# Patient Record
Sex: MALE | Race: WHITE | NOT HISPANIC OR LATINO | URBAN - METROPOLITAN AREA
[De-identification: names, ages, dates, MRNs, and addresses within clinical notes are randomized per-mention and may not be internally consistent; named-entity substitution may affect disease eponyms.]

---

## 2020-02-14 ENCOUNTER — HOSPITAL ENCOUNTER (OUTPATIENT)
Facility: OTHER | Age: 80
Discharge: HOME | End: 2020-02-15
Attending: EMERGENCY MEDICINE | Admitting: EMERGENCY MEDICINE
Payer: MEDICARE

## 2020-02-14 ENCOUNTER — OFFICE VISIT (OUTPATIENT)
Dept: URGENT CARE | Facility: CLINIC | Age: 80
End: 2020-02-14
Payer: MEDICARE

## 2020-02-14 VITALS
SYSTOLIC BLOOD PRESSURE: 159 MMHG | OXYGEN SATURATION: 91 % | BODY MASS INDEX: 26.51 KG/M2 | RESPIRATION RATE: 18 BRPM | WEIGHT: 200 LBS | HEIGHT: 73 IN | DIASTOLIC BLOOD PRESSURE: 90 MMHG | HEART RATE: 89 BPM | TEMPERATURE: 102 F

## 2020-02-14 DIAGNOSIS — I10 ESSENTIAL HYPERTENSION: ICD-10-CM

## 2020-02-14 DIAGNOSIS — J10.1 INFLUENZA A: Primary | ICD-10-CM

## 2020-02-14 DIAGNOSIS — R09.02 HYPOXIA: ICD-10-CM

## 2020-02-14 DIAGNOSIS — E86.0 DEHYDRATION: ICD-10-CM

## 2020-02-14 DIAGNOSIS — R06.02 SHORTNESS OF BREATH: ICD-10-CM

## 2020-02-14 DIAGNOSIS — J98.4 RESTRICTIVE LUNG DISEASE: ICD-10-CM

## 2020-02-14 DIAGNOSIS — R50.9 FEVER, UNSPECIFIED FEVER CAUSE: ICD-10-CM

## 2020-02-14 DIAGNOSIS — R53.1 WEAKNESS: ICD-10-CM

## 2020-02-14 PROBLEM — E78.5 HYPERLIPIDEMIA: Status: ACTIVE | Noted: 2020-02-14

## 2020-02-14 LAB
ALBUMIN SERPL BCP-MCNC: 3.9 G/DL (ref 3.5–5.2)
ALP SERPL-CCNC: 102 U/L (ref 55–135)
ALT SERPL W/O P-5'-P-CCNC: 24 U/L (ref 10–44)
ANION GAP SERPL CALC-SCNC: 15 MMOL/L (ref 8–16)
AST SERPL-CCNC: 23 U/L (ref 10–40)
BASOPHILS # BLD AUTO: 0.01 K/UL (ref 0–0.2)
BASOPHILS NFR BLD: 0.2 % (ref 0–1.9)
BILIRUB SERPL-MCNC: 1.1 MG/DL (ref 0.1–1)
BNP SERPL-MCNC: 145 PG/ML (ref 0–99)
BUN SERPL-MCNC: 13 MG/DL (ref 8–23)
CALCIUM SERPL-MCNC: 9.5 MG/DL (ref 8.7–10.5)
CHLORIDE SERPL-SCNC: 96 MMOL/L (ref 95–110)
CO2 SERPL-SCNC: 29 MMOL/L (ref 23–29)
CREAT SERPL-MCNC: 0.9 MG/DL (ref 0.5–1.4)
CTP QC/QA: YES
DIFFERENTIAL METHOD: ABNORMAL
EOSINOPHIL # BLD AUTO: 0 K/UL (ref 0–0.5)
EOSINOPHIL NFR BLD: 0.4 % (ref 0–8)
ERYTHROCYTE [DISTWIDTH] IN BLOOD BY AUTOMATED COUNT: 15.8 % (ref 11.5–14.5)
EST. GFR  (AFRICAN AMERICAN): >60 ML/MIN/1.73 M^2
EST. GFR  (NON AFRICAN AMERICAN): >60 ML/MIN/1.73 M^2
EXTRA BLUE TOP RAINBOW: NORMAL
EXTRA GOLD TOP RAINBOW: NORMAL
EXTRA GREEN TOP RAINBOW: NORMAL
EXTRA LAVENDER TOP RAINBOW: NORMAL
EXTRA RED TOP RAINBOW: NORMAL
FLUAV AG NPH QL: POSITIVE
FLUBV AG NPH QL: NEGATIVE
GLUCOSE SERPL-MCNC: 109 MG/DL (ref 70–110)
HCT VFR BLD AUTO: 47.8 % (ref 40–54)
HGB BLD-MCNC: 14.9 G/DL (ref 14–18)
IMM GRANULOCYTES # BLD AUTO: 0.09 K/UL (ref 0–0.04)
IMM GRANULOCYTES NFR BLD AUTO: 1.6 % (ref 0–0.5)
LYMPHOCYTES # BLD AUTO: 0.6 K/UL (ref 1–4.8)
LYMPHOCYTES NFR BLD: 10.2 % (ref 18–48)
MCH RBC QN AUTO: 31.9 PG (ref 27–31)
MCHC RBC AUTO-ENTMCNC: 31.2 G/DL (ref 32–36)
MCV RBC AUTO: 102 FL (ref 82–98)
MONOCYTES # BLD AUTO: 0.7 K/UL (ref 0.3–1)
MONOCYTES NFR BLD: 13.2 % (ref 4–15)
NEUTROPHILS # BLD AUTO: 4.2 K/UL (ref 1.8–7.7)
NEUTROPHILS NFR BLD: 74.4 % (ref 38–73)
NRBC BLD-RTO: 0 /100 WBC
PLATELET # BLD AUTO: 97 K/UL (ref 150–350)
PMV BLD AUTO: 11.9 FL (ref 9.2–12.9)
POTASSIUM SERPL-SCNC: 3.5 MMOL/L (ref 3.5–5.1)
PROT SERPL-MCNC: 6.9 G/DL (ref 6–8.4)
RBC # BLD AUTO: 4.67 M/UL (ref 4.6–6.2)
SODIUM SERPL-SCNC: 140 MMOL/L (ref 136–145)
TROPONIN I SERPL DL<=0.01 NG/ML-MCNC: 0.03 NG/ML (ref 0–0.03)
WBC # BLD AUTO: 5.6 K/UL (ref 3.9–12.7)

## 2020-02-14 PROCEDURE — 87804 POCT INFLUENZA A/B: ICD-10-PCS | Mod: 59,QW,S$GLB, | Performed by: STUDENT IN AN ORGANIZED HEALTH CARE EDUCATION/TRAINING PROGRAM

## 2020-02-14 PROCEDURE — 63600175 PHARM REV CODE 636 W HCPCS: Performed by: EMERGENCY MEDICINE

## 2020-02-14 PROCEDURE — 99203 PR OFFICE/OUTPT VISIT, NEW, LEVL III, 30-44 MIN: ICD-10-PCS | Mod: 25,S$GLB,, | Performed by: STUDENT IN AN ORGANIZED HEALTH CARE EDUCATION/TRAINING PROGRAM

## 2020-02-14 PROCEDURE — 96360 HYDRATION IV INFUSION INIT: CPT

## 2020-02-14 PROCEDURE — G0378 HOSPITAL OBSERVATION PER HR: HCPCS

## 2020-02-14 PROCEDURE — 36415 COLL VENOUS BLD VENIPUNCTURE: CPT

## 2020-02-14 PROCEDURE — 71046 X-RAY EXAM CHEST 2 VIEWS: CPT | Mod: FY,S$GLB,, | Performed by: RADIOLOGY

## 2020-02-14 PROCEDURE — 96361 HYDRATE IV INFUSION ADD-ON: CPT

## 2020-02-14 PROCEDURE — 25000003 PHARM REV CODE 250: Performed by: NURSE PRACTITIONER

## 2020-02-14 PROCEDURE — 27000221 HC OXYGEN, UP TO 24 HOURS

## 2020-02-14 PROCEDURE — 25000242 PHARM REV CODE 250 ALT 637 W/ HCPCS: Performed by: EMERGENCY MEDICINE

## 2020-02-14 PROCEDURE — 99900035 HC TECH TIME PER 15 MIN (STAT)

## 2020-02-14 PROCEDURE — 83880 ASSAY OF NATRIURETIC PEPTIDE: CPT

## 2020-02-14 PROCEDURE — 80053 COMPREHEN METABOLIC PANEL: CPT

## 2020-02-14 PROCEDURE — 99220 PR INITIAL OBSERVATION CARE,LEVL III: ICD-10-PCS | Mod: ,,, | Performed by: NURSE PRACTITIONER

## 2020-02-14 PROCEDURE — 71046 XR CHEST PA AND LATERAL: ICD-10-PCS | Mod: FY,S$GLB,, | Performed by: RADIOLOGY

## 2020-02-14 PROCEDURE — 99203 OFFICE O/P NEW LOW 30 MIN: CPT | Mod: 25,S$GLB,, | Performed by: STUDENT IN AN ORGANIZED HEALTH CARE EDUCATION/TRAINING PROGRAM

## 2020-02-14 PROCEDURE — 84484 ASSAY OF TROPONIN QUANT: CPT

## 2020-02-14 PROCEDURE — 99220 PR INITIAL OBSERVATION CARE,LEVL III: CPT | Mod: ,,, | Performed by: NURSE PRACTITIONER

## 2020-02-14 PROCEDURE — 27000190 HC CPAP FULL FACE MASK W/VALVE

## 2020-02-14 PROCEDURE — 94761 N-INVAS EAR/PLS OXIMETRY MLT: CPT

## 2020-02-14 PROCEDURE — 85025 COMPLETE CBC W/AUTO DIFF WBC: CPT

## 2020-02-14 PROCEDURE — 94660 CPAP INITIATION&MGMT: CPT

## 2020-02-14 PROCEDURE — 25000003 PHARM REV CODE 250: Performed by: EMERGENCY MEDICINE

## 2020-02-14 PROCEDURE — 94640 AIRWAY INHALATION TREATMENT: CPT

## 2020-02-14 PROCEDURE — 99285 EMERGENCY DEPT VISIT HI MDM: CPT | Mod: 25

## 2020-02-14 PROCEDURE — 83605 ASSAY OF LACTIC ACID: CPT

## 2020-02-14 PROCEDURE — 87804 INFLUENZA ASSAY W/OPTIC: CPT | Mod: QW,S$GLB,, | Performed by: STUDENT IN AN ORGANIZED HEALTH CARE EDUCATION/TRAINING PROGRAM

## 2020-02-14 RX ORDER — SODIUM CHLORIDE 0.9 % (FLUSH) 0.9 %
10 SYRINGE (ML) INJECTION
Status: DISCONTINUED | OUTPATIENT
Start: 2020-02-14 | End: 2020-02-15 | Stop reason: HOSPADM

## 2020-02-14 RX ORDER — OSELTAMIVIR PHOSPHATE 75 MG/1
75 CAPSULE ORAL 2 TIMES DAILY
Status: DISCONTINUED | OUTPATIENT
Start: 2020-02-14 | End: 2020-02-15 | Stop reason: HOSPADM

## 2020-02-14 RX ORDER — METOPROLOL SUCCINATE 25 MG/1
25 TABLET, EXTENDED RELEASE ORAL DAILY
Status: DISCONTINUED | OUTPATIENT
Start: 2020-02-15 | End: 2020-02-15 | Stop reason: HOSPADM

## 2020-02-14 RX ORDER — ALLOPURINOL 300 MG/1
TABLET ORAL
COMMUNITY
Start: 2020-01-11

## 2020-02-14 RX ORDER — IPRATROPIUM BROMIDE AND ALBUTEROL SULFATE 2.5; .5 MG/3ML; MG/3ML
3 SOLUTION RESPIRATORY (INHALATION) EVERY 4 HOURS PRN
Status: DISCONTINUED | OUTPATIENT
Start: 2020-02-14 | End: 2020-02-15 | Stop reason: HOSPADM

## 2020-02-14 RX ORDER — ACETAMINOPHEN 325 MG/1
650 TABLET ORAL EVERY 4 HOURS PRN
Status: DISCONTINUED | OUTPATIENT
Start: 2020-02-14 | End: 2020-02-15 | Stop reason: HOSPADM

## 2020-02-14 RX ORDER — LOSARTAN POTASSIUM 25 MG/1
25 TABLET ORAL NIGHTLY
Status: DISCONTINUED | OUTPATIENT
Start: 2020-02-14 | End: 2020-02-15 | Stop reason: HOSPADM

## 2020-02-14 RX ORDER — CLOPIDOGREL BISULFATE 75 MG/1
TABLET ORAL
COMMUNITY
Start: 2019-11-27

## 2020-02-14 RX ORDER — ACETAMINOPHEN 500 MG
1000 TABLET ORAL
Status: DISCONTINUED | OUTPATIENT
Start: 2020-02-14 | End: 2020-02-14 | Stop reason: HOSPADM

## 2020-02-14 RX ORDER — ONDANSETRON 8 MG/1
8 TABLET, ORALLY DISINTEGRATING ORAL EVERY 8 HOURS PRN
Status: DISCONTINUED | OUTPATIENT
Start: 2020-02-14 | End: 2020-02-15 | Stop reason: HOSPADM

## 2020-02-14 RX ORDER — PRAVASTATIN SODIUM 20 MG/1
80 TABLET ORAL NIGHTLY
Status: DISCONTINUED | OUTPATIENT
Start: 2020-02-14 | End: 2020-02-15 | Stop reason: HOSPADM

## 2020-02-14 RX ORDER — METHYLPREDNISOLONE 4 MG/1
4 TABLET ORAL DAILY
Status: DISCONTINUED | OUTPATIENT
Start: 2020-02-15 | End: 2020-02-15 | Stop reason: HOSPADM

## 2020-02-14 RX ORDER — PRAVASTATIN SODIUM 80 MG/1
TABLET ORAL
COMMUNITY
Start: 2019-12-03

## 2020-02-14 RX ORDER — IRBESARTAN 150 MG/1
TABLET ORAL
COMMUNITY
Start: 2019-11-15

## 2020-02-14 RX ORDER — CLOPIDOGREL BISULFATE 75 MG/1
75 TABLET ORAL DAILY
Status: DISCONTINUED | OUTPATIENT
Start: 2020-02-15 | End: 2020-02-15 | Stop reason: HOSPADM

## 2020-02-14 RX ORDER — METHYLPREDNISOLONE 4 MG/1
4 TABLET ORAL DAILY
COMMUNITY

## 2020-02-14 RX ORDER — ALLOPURINOL 300 MG/1
300 TABLET ORAL DAILY
Status: DISCONTINUED | OUTPATIENT
Start: 2020-02-15 | End: 2020-02-15 | Stop reason: HOSPADM

## 2020-02-14 RX ORDER — IPRATROPIUM BROMIDE AND ALBUTEROL SULFATE 2.5; .5 MG/3ML; MG/3ML
3 SOLUTION RESPIRATORY (INHALATION)
Status: COMPLETED | OUTPATIENT
Start: 2020-02-14 | End: 2020-02-14

## 2020-02-14 RX ORDER — OSELTAMIVIR PHOSPHATE 75 MG/1
75 CAPSULE ORAL
Status: COMPLETED | OUTPATIENT
Start: 2020-02-14 | End: 2020-02-14

## 2020-02-14 RX ORDER — METOPROLOL SUCCINATE 25 MG/1
25 TABLET, EXTENDED RELEASE ORAL DAILY
COMMUNITY

## 2020-02-14 RX ORDER — LOSARTAN POTASSIUM 25 MG/1
TABLET ORAL
COMMUNITY
Start: 2019-12-03

## 2020-02-14 RX ORDER — IBUPROFEN 400 MG/1
800 TABLET ORAL
Status: DISCONTINUED | OUTPATIENT
Start: 2020-02-14 | End: 2020-02-14

## 2020-02-14 RX ADMIN — IPRATROPIUM BROMIDE AND ALBUTEROL SULFATE 3 ML: .5; 3 SOLUTION RESPIRATORY (INHALATION) at 04:02

## 2020-02-14 RX ADMIN — SODIUM CHLORIDE 1000 ML: 0.9 INJECTION, SOLUTION INTRAVENOUS at 03:02

## 2020-02-14 RX ADMIN — APIXABAN 5 MG: 2.5 TABLET, FILM COATED ORAL at 09:02

## 2020-02-14 RX ADMIN — OSELTAMIVIR PHOSPHATE 75 MG: 75 CAPSULE ORAL at 03:02

## 2020-02-14 RX ADMIN — PRAVASTATIN SODIUM 80 MG: 20 TABLET ORAL at 09:02

## 2020-02-14 RX ADMIN — LOSARTAN POTASSIUM 25 MG: 25 TABLET ORAL at 09:02

## 2020-02-14 RX ADMIN — IPRATROPIUM BROMIDE AND ALBUTEROL SULFATE 3 ML: .5; 3 SOLUTION RESPIRATORY (INHALATION) at 03:02

## 2020-02-14 RX ADMIN — OSELTAMIVIR PHOSPHATE 75 MG: 75 CAPSULE ORAL at 09:02

## 2020-02-14 RX ADMIN — Medication 1000 MG: at 12:02

## 2020-02-14 NOTE — ED PROVIDER NOTES
Encounter Date: 2/14/2020    SCRIBE #1 NOTE: I, Velvet Ribera, am scribing for, and in the presence of, Dr. Willoughby.       History     Chief Complaint   Patient presents with    Influenza     sent from urgent care after being dx'd with flu.  Pt reports increased SOB.  Reports hx of MI, a.fib, and restrictive lung disease.     Time seen by provider: 3:00 PM    This is a 79 y.o. male with hx of HTN, Afib on Eliquis, MI, and unknown restrictive lung disease who presents with complaint of fatigue since yesterday morning. He reports fever, loss of appetite, rhinorrhea, cough, chronic shortness of breath, nausea, difficulty ambulating, myalgias, and headache. He denies vomiting, diarrhea, or constipation. He was seen at Urgent Care today, had chest X-Ray, tested positive for Influenza A, and was instructed to go to the ED for further evaluation. He uses CPAP at night. He denies recent international travel. No sick contacts. He is visiting from [place] and plans to return home tomorrow.    The history is provided by the patient, medical records and the spouse.     Review of patient's allergies indicates:   Allergen Reactions    Bee sting [allergen ext-venom-honey bee] Anaphylaxis    Penicillins     Ticagrelor      Past Medical History:   Diagnosis Date    Autoimmune disorder     Gout     Hyperlipidemia     Hypertension      Past Surgical History:   Procedure Laterality Date    CARDIAC SURGERY       History reviewed. No pertinent family history.  Social History     Tobacco Use    Smoking status: Never Smoker   Substance Use Topics    Alcohol use: Yes     Comment: social    Drug use: Not on file     Review of Systems   Constitutional: Positive for appetite change, fatigue and fever. Negative for chills.   HENT: Positive for rhinorrhea. Negative for congestion and sore throat.    Eyes: Negative for photophobia and redness.   Respiratory: Positive for cough and shortness of breath.    Cardiovascular: Negative for  chest pain.   Gastrointestinal: Positive for nausea. Negative for abdominal pain, constipation, diarrhea and vomiting.   Genitourinary: Negative for dysuria.   Musculoskeletal: Positive for gait problem and myalgias. Negative for back pain.   Skin: Negative for rash.   Neurological: Positive for headaches. Negative for weakness and light-headedness.   Psychiatric/Behavioral: Negative for confusion.       Physical Exam     Initial Vitals [02/14/20 1323]   BP Pulse Resp Temp SpO2   139/75 86 (!) 22 100.2 °F (37.9 °C) (!) 92 %      MAP       --         Physical Exam    Nursing note and vitals reviewed.  Constitutional: He appears well-developed and well-nourished. He is not diaphoretic. No distress.   HENT:   Head: Normocephalic and atraumatic.   Eyes: Conjunctivae and EOM are normal. Pupils are equal, round, and reactive to light. No scleral icterus.   Neck: Normal range of motion. Neck supple.   Cardiovascular: Normal rate, regular rhythm and normal heart sounds. Exam reveals no gallop and no friction rub.    No murmur heard.  Pulmonary/Chest: No respiratory distress. He has wheezes (mild). He has no rhonchi. He has no rales.   Abdominal: Soft. There is no tenderness.   Musculoskeletal: Normal range of motion. He exhibits no edema or tenderness.   Neurological: He is alert and oriented to person, place, and time.   Skin: Skin is warm and dry.         ED Course   Procedures  Labs Reviewed   CBC W/ AUTO DIFFERENTIAL - Abnormal; Notable for the following components:       Result Value    Mean Corpuscular Volume 102 (*)     Mean Corpuscular Hemoglobin 31.9 (*)     Mean Corpuscular Hemoglobin Conc 31.2 (*)     RDW 15.8 (*)     Platelets 97 (*)     Immature Granulocytes 1.6 (*)     Immature Grans (Abs) 0.09 (*)     Lymph # 0.6 (*)     Gran% 74.4 (*)     Lymph% 10.2 (*)     All other components within normal limits   COMPREHENSIVE METABOLIC PANEL - Abnormal; Notable for the following components:    Total Bilirubin 1.1 (*)      All other components within normal limits   B-TYPE NATRIURETIC PEPTIDE - Abnormal; Notable for the following components:     (*)     All other components within normal limits   TROPONIN I - Abnormal; Notable for the following components:    Troponin I 0.033 (*)     All other components within normal limits   BLUE-TOP TUBE   GREEN-TOP TUBE   LAVENDER-TOP TUBE   GOLD-TOP TUBE   RED-TOP TUBE   CBC W/ AUTO DIFFERENTIAL   COMPREHENSIVE METABOLIC PANEL   B-TYPE NATRIURETIC PEPTIDE   TROPONIN I          Imaging Results    None          Medical Decision Making:   History:   Old Medical Records: I decided to obtain old medical records.  Clinical Tests:   Lab Tests: Ordered and Reviewed            Scribe Attestation:   Scribe #1: I performed the above scribed service and the documentation accurately describes the services I performed. I attest to the accuracy of the note.    Attending Attestation:           Physician Attestation for Scribe:  Physician Attestation Statement for Scribe #1: I, Dr. Willoughby, reviewed documentation, as scribed by Velvet Ribera in my presence, and it is both accurate and complete.         Attending ED Notes:   Emergent evaluation a 79-year-old male with complaint of generalized weakness, fatigue, near syncope and shortness of breath.  Patient had recent diagnosis of influenza at urgent care.  Patient unable to stand secondary to weakness. Patient is afebrile, nontoxic-appearing stable vital signs.  Oxygen saturation is 92% on room air.  Oxygen saturation increases to 98% with 2 L of oxygen by nasal cannula.  Patient has no elevation white blood cell count.  H&H within normal limits. No acute findings on CMP except for total bili 1.1 BNP is 145.  Troponin is 0.033.  The patient is extensively counseled on his diagnosis and treatment including laboratory and physical exam findings.  The patient is admitted in stable condition.          ED Course as of Feb 15 2039   Fri Feb 14, 2020   1813  Discussed case with Noah Wu NP, and will admit patient to Dr. Ramirez.    [AC]      ED Course User Index  [AC] Velvet Ribera                Clinical Impression:     1. Influenza A    2. Dehydration    3. Weakness    4. Shortness of breath    5. Restrictive lung disease    6. Essential hypertension                              Jerson Degroot MD  02/15/20 2039

## 2020-02-14 NOTE — ED NOTES
"Pt refused ibuprofen, stating " I cant take Ibuprofen because im in blood thinners."   MD aware.  "

## 2020-02-14 NOTE — PATIENT INSTRUCTIONS
The Flu (Influenza)     The virus that causes the flu spreads through the air in droplets when someone who has the flu coughs, sneezes, laughs, or talks.   The flu (influenza) is an infection that affects your respiratory tract. This tract is made up of your mouth, nose, and lungs, and the passages between them. Unlike a cold, the flu can make you very ill. And it can lead to pneumonia, a serious lung infection. The flu can have serious complications and even cause death.  Who is at risk for the flu?  Anyone can get the flu. But you are more likely to become infected if you:  · Have a weakened immune system  · Work in a healthcare setting where you may be exposed to flu germs  · Live or work with someone who has the flu  · Havent had an annual flu shot  How does the flu spread?  The flu is caused by a virus. The virus spreads through the air in droplets when someone who has the flu coughs, sneezes, laughs, or talks. You can become infected when you inhale these viruses directly. You can also become infected when you touch a surface on which the droplets have landed and then transfer the germs to your eyes, nose, or mouth. Touching used tissues, or sharing utensils, drinking glasses, or a toothbrush from an infected person can expose you to flu viruses, too.  What are the symptoms of the flu?  Flu symptoms tend to come on quickly and may last a few days to a few weeks. They include:  · Fever usually higher than 100.4°F  (38°C) and chills  · Sore throat and headache  · Dry cough  · Runny nose  · Tiredness and weakness  · Muscle aches  Who is at risk for flu complications?  For some people, the flu can be very serious. The risk for complications is greater for:  · Children younger than age 5  · Adults ages 65 and older  · People with a chronic illness such as diabetes or heart, kidney, or lung disease  · People who live in a nursing home or long-term care facility   How is the flu treated?  The flu usually gets  better after 7 days or so. In some cases, your healthcare provider may prescribe an antiviral medicine. This may help you get well a little sooner. For the medicine to help, you need to take it as soon as possible (ideally within 48 hours) after your symptoms start. If you develop pneumonia or other serious illness, you may need to stay in the hospital.  Easing flu symptoms  · Drink lots of fluids such as water, juice, and warm soup. A good rule is to drink enough so that you urinate your normal amount.  · Get plenty of rest.  · Ask your healthcare provider what to take for fever and pain.  · Call your provider if your fever is 100.4°F (38°C) or higher, or you become dizzy, lightheaded, or short of breath.  Taking steps to protect others  · Wash your hands often, especially after coughing or sneezing. Or clean your hands with an alcohol-based hand  containing at least 60% alcohol.  · Cough or sneeze into a tissue. Then throw the tissue away and wash your hands. If you dont have a tissue, cough and sneeze into your elbow.  · Stay home until at least 24 hours after you no longer have a fever or chills. Be sure the fever isnt being hidden by fever-reducing medicine.  · Dont share food, utensils, drinking glasses, or a toothbrush with others.  · Ask your healthcare provider if others in your household should get antiviral medicine to help them avoid infection.  How can the flu be prevented?  · One of the best ways to avoid the flu is to get a flu vaccine each year. The virus that causes the flu changes from year to year. For that reason, healthcare providers recommend getting the flu vaccine each year, as soon as it's available in your area. The vaccine is given as a shot. Your healthcare provider can tell you which vaccine is right for you. A nasal spray is also available but is not recommended for the 3095-0506 flu season. The CDC says this is because the nasal spray did not seem to protect against the flu  over the last several flu seasons. In the past, it was meant for people ages 2 to 49.  · Wash your hands often. Frequent handwashing is a proven way to help prevent infection.  · Carry an alcohol-based hand gel containing at least 60% alcohol. Use it when you can't use soap and water. Then wash your hands as soon as you can.  · Avoid touching your eyes, nose, and mouth.  · At home and work, clean phones, computer keyboards, and toys often with disinfectant wipes.  · If possible, avoid close contact with others who have the flu or symptoms of the flu.  Handwashing tips  Handwashing is one of the best ways to prevent many common infections. If you are caring for or visiting someone with the flu, wash your hands each time you enter and leave the room. Follow these steps:  · Use warm water and plenty of soap. Rub your hands together well.  · Clean the whole hand, including under your nails, between your fingers, and up the wrists.  · Wash for at least 15 seconds.  · Rinse, letting the water run down your fingers, not up your wrists.  · Dry your hands well. Use a paper towel to turn off the faucet and open the door.  Using alcohol-based hand   Alcohol-based hand  are also a good choice. Use them when you can't use soap and water. Follow these steps:  · Squeeze about a tablespoon of gel into the palm of one hand.  · Rub your hands together briskly, cleaning the backs of your hands, the palms, between your fingers, and up the wrists.  · Rub until the gel is gone and your hands are completely dry.  Preventing the flu in healthcare settings  The flu is a special concern for people in hospitals and long-term care facilities. To help prevent the spread of flu, many hospitals and nursing homes take these steps:  · Healthcare providers wash their hands or use an alcohol-based hand  before and after treating each patient.  · People with the flu have private rooms and bathrooms or share a room with someone  with the same infection.  · People who are at high risk for the flu but don't have it are encouraged to get the flu and pneumonia vaccines.  · All healthcare workers are encouraged or required to get flu shots.   Date Last Reviewed: 12/1/2016  © 8457-6532 [employer]. [address] [place] [address]. All rights reserved. This information is not intended as a substitute for professional medical care. Always follow your healthcare professional's instructions.

## 2020-02-14 NOTE — ED TRIAGE NOTES
Pt presents to ed c/o flu like s/s. Pt states having fever, chills, unsteady gait, generalized body aches, with nausea but denies any vomiting. Pt states taking 1g of tylenol at urgent care. Pt awake and alert, denies any needs at this point.

## 2020-02-14 NOTE — PROGRESS NOTES
"Subjective:       Patient ID: Teo Escalona is a 79 y.o. male.    Vitals:  height is 6' 1" (1.854 m) and weight is 90.7 kg (200 lb). His temperature is 102.2 °F (39 °C) (abnormal). His blood pressure is 159/90 (abnormal) and his pulse is 89. His respiration is 18.     Chief Complaint: Fatigue    Pt is 78yo M with PMHx of HTN, CAD, afib (on eliquis), PMR, and unknown chronic lung disease who presents for flu-like symptoms. Pt is visiting from [place]. Pt complains of f/c, dizziness, SoB, body aches, and fatigue x2d. Pt last took tylenol 500mg at 8am.     Fatigue   This is a new problem. The current episode started in the past 7 days. The problem occurs constantly. The problem has been unchanged. Associated symptoms include chills, congestion, coughing, fatigue, a fever, headaches, myalgias, nausea and a sore throat. Pertinent negatives include no abdominal pain, anorexia, arthralgias, chest pain, diaphoresis, joint swelling, neck pain, numbness, rash, vomiting or weakness. Nothing aggravates the symptoms. He has tried acetaminophen (benadryl) for the symptoms. The treatment provided mild relief.       Constitution: Positive for chills, fatigue and fever. Negative for sweating.   HENT: Positive for congestion and sore throat. Negative for ear pain, sinus pain, sinus pressure, trouble swallowing and voice change.    Neck: Negative for neck pain, neck stiffness and painful lymph nodes.   Cardiovascular: Positive for sob on exertion. Negative for chest pain, leg swelling and palpitations.   Eyes: Negative for eye discharge, eye itching, eye redness and blurred vision.   Respiratory: Positive for cough and shortness of breath. Negative for chest tightness, sputum production, COPD, stridor, wheezing and asthma.    Gastrointestinal: Positive for nausea. Negative for abdominal pain, vomiting and diarrhea.   Genitourinary: Negative for urine decreased.   Musculoskeletal: Positive for muscle ache. Negative for joint pain " "and joint swelling.   Skin: Negative for color change, rash and lesion.   Allergic/Immunologic: Negative for seasonal allergies, asthma and sneezing.   Neurological: Positive for dizziness, light-headedness and headaches. Negative for passing out and numbness.   Hematologic/Lymphatic: Negative for swollen lymph nodes.   Psychiatric/Behavioral: Negative for confusion, agitation and sleep disturbance.       Objective:       Vitals:    02/14/20 1119 02/14/20 1252   BP: (!) 159/90    Pulse: 89    Resp: 18    Temp: (!) 102.2 °F (39 °C) (!) 102.3 °F (39.1 °C)   TempSrc:  Tympanic   SpO2: (!) 91%    Weight: 90.7 kg (200 lb)    Height: 6' 1" (1.854 m)      Physical Exam   Constitutional: He is oriented to person, place, and time. He appears well-developed and well-nourished. No distress.   HENT:   Head: Normocephalic and atraumatic.   Right Ear: Hearing, tympanic membrane, external ear and ear canal normal.   Left Ear: Hearing, tympanic membrane, external ear and ear canal normal.   Nose: Rhinorrhea present. No mucosal edema or purulent discharge.   Mouth/Throat: Uvula is midline. Mucous membranes are dry. Posterior oropharyngeal erythema present. No posterior oropharyngeal edema or tonsillar abscesses.   Eyes: Conjunctivae and EOM are normal. Right eye exhibits no discharge. Left eye exhibits no discharge.   Neck: Normal range of motion. Neck supple.   Cardiovascular: Normal rate, regular rhythm and normal heart sounds.   No murmur heard.  Pulmonary/Chest: Effort normal. He has decreased breath sounds. He has no wheezes. He has rales (b/l LL crackles).   Abdominal: Soft. Bowel sounds are normal. He exhibits no distension. There is no tenderness.   Musculoskeletal: Normal range of motion. He exhibits no edema or tenderness.   Neurological: He is alert and oriented to person, place, and time. No cranial nerve deficit (CN II-XII grossly intact).   Skin: Skin is warm, dry and no rash.   Psychiatric: He has a normal mood and " affect. His behavior is normal. Judgment and thought content normal.   Nursing note and vitals reviewed.    Recent Lab Results       02/14/20  1145         Acceptable Yes     Rapid Influenza A Ag Positive     Rapid Influenza B Ag Negative           XR CHEST PA AND LATERAL  Narrative: EXAMINATION:  XR CHEST PA AND LATERAL    CLINICAL HISTORY:  +Influenza with b/l crackles and hypoxia; reports chronic hypoxia with chronic atelectasis/lung scarring, no priors; Influenza due to other identified influenza virus with other respiratory manifestations    TECHNIQUE:  PA and lateral views of the chest were performed.    COMPARISON:  None    FINDINGS:  Heart size normal.  Small subsegmental atelectatic changes noted at the left lung base.  Otherwise the lungs are clear.  No pleural effusion.  Impression: See above    Electronically signed by: Teo Hollins MD  Date:    02/14/2020  Time:    12:22        Assessment:       1. Influenza A    2. Fever, unspecified fever cause    3. Hypoxia        Plan:         Influenza A  -     POCT Influenza A/B  -     XR CHEST PA AND LATERAL; Future; Expected date: 02/14/2020  -     Refer to Emergency Dept.    Fever, unspecified fever cause  -     acetaminophen tablet 1,000 mg  -     Refer to Emergency Dept.    Hypoxia  -     Refer to Emergency Dept.    Pt is 80yo M with PMHx of HTN, CAD, afib (on eliquis), PMR, and unknown chronic lung disease who presents with influenza A. CXR with no acute process but evidence of chronic lung disease. Fever without response 40 minutes after 1g Tylenol. Given comorbidities and symptoms of SoB and dizziness, recommended evaluation at [facility] ED. Questions answered and pt and wife expressed understanding.    Rangel Agee MD/MPH  [facility]  [facility]

## 2020-02-15 VITALS
WEIGHT: 202.94 LBS | SYSTOLIC BLOOD PRESSURE: 125 MMHG | BODY MASS INDEX: 26.9 KG/M2 | TEMPERATURE: 99 F | DIASTOLIC BLOOD PRESSURE: 68 MMHG | RESPIRATION RATE: 18 BRPM | HEIGHT: 73 IN | HEART RATE: 70 BPM | OXYGEN SATURATION: 91 %

## 2020-02-15 LAB
ALBUMIN SERPL BCP-MCNC: 3.1 G/DL (ref 3.5–5.2)
ALP SERPL-CCNC: 81 U/L (ref 55–135)
ALT SERPL W/O P-5'-P-CCNC: 19 U/L (ref 10–44)
ANION GAP SERPL CALC-SCNC: 10 MMOL/L (ref 8–16)
AST SERPL-CCNC: 20 U/L (ref 10–40)
BASOPHILS # BLD AUTO: 0.01 K/UL (ref 0–0.2)
BASOPHILS NFR BLD: 0.3 % (ref 0–1.9)
BILIRUB SERPL-MCNC: 1 MG/DL (ref 0.1–1)
BUN SERPL-MCNC: 11 MG/DL (ref 8–23)
CALCIUM SERPL-MCNC: 8.4 MG/DL (ref 8.7–10.5)
CHLORIDE SERPL-SCNC: 101 MMOL/L (ref 95–110)
CO2 SERPL-SCNC: 26 MMOL/L (ref 23–29)
CREAT SERPL-MCNC: 0.7 MG/DL (ref 0.5–1.4)
DIFFERENTIAL METHOD: ABNORMAL
EOSINOPHIL # BLD AUTO: 0 K/UL (ref 0–0.5)
EOSINOPHIL NFR BLD: 0.6 % (ref 0–8)
ERYTHROCYTE [DISTWIDTH] IN BLOOD BY AUTOMATED COUNT: 15.7 % (ref 11.5–14.5)
EST. GFR  (AFRICAN AMERICAN): >60 ML/MIN/1.73 M^2
EST. GFR  (NON AFRICAN AMERICAN): >60 ML/MIN/1.73 M^2
GLUCOSE SERPL-MCNC: 82 MG/DL (ref 70–110)
HCT VFR BLD AUTO: 41.5 % (ref 40–54)
HGB BLD-MCNC: 13.2 G/DL (ref 14–18)
IMM GRANULOCYTES # BLD AUTO: 0.06 K/UL (ref 0–0.04)
IMM GRANULOCYTES NFR BLD AUTO: 1.7 % (ref 0–0.5)
LACTATE SERPL-SCNC: 1 MMOL/L (ref 0.5–2.2)
LYMPHOCYTES # BLD AUTO: 0.9 K/UL (ref 1–4.8)
LYMPHOCYTES NFR BLD: 25.9 % (ref 18–48)
MAGNESIUM SERPL-MCNC: 1.5 MG/DL (ref 1.6–2.6)
MCH RBC QN AUTO: 32.3 PG (ref 27–31)
MCHC RBC AUTO-ENTMCNC: 31.8 G/DL (ref 32–36)
MCV RBC AUTO: 102 FL (ref 82–98)
MONOCYTES # BLD AUTO: 0.7 K/UL (ref 0.3–1)
MONOCYTES NFR BLD: 18.9 % (ref 4–15)
NEUTROPHILS # BLD AUTO: 1.9 K/UL (ref 1.8–7.7)
NEUTROPHILS NFR BLD: 52.6 % (ref 38–73)
NRBC BLD-RTO: 0 /100 WBC
PHOSPHATE SERPL-MCNC: 3.6 MG/DL (ref 2.7–4.5)
PLATELET # BLD AUTO: 89 K/UL (ref 150–350)
PMV BLD AUTO: 11.3 FL (ref 9.2–12.9)
POTASSIUM SERPL-SCNC: 3.3 MMOL/L (ref 3.5–5.1)
PROT SERPL-MCNC: 5.6 G/DL (ref 6–8.4)
RBC # BLD AUTO: 4.09 M/UL (ref 4.6–6.2)
SODIUM SERPL-SCNC: 137 MMOL/L (ref 136–145)
WBC # BLD AUTO: 3.59 K/UL (ref 3.9–12.7)

## 2020-02-15 PROCEDURE — 99900035 HC TECH TIME PER 15 MIN (STAT)

## 2020-02-15 PROCEDURE — 83735 ASSAY OF MAGNESIUM: CPT

## 2020-02-15 PROCEDURE — G0378 HOSPITAL OBSERVATION PER HR: HCPCS

## 2020-02-15 PROCEDURE — 94761 N-INVAS EAR/PLS OXIMETRY MLT: CPT

## 2020-02-15 PROCEDURE — 85025 COMPLETE CBC W/AUTO DIFF WBC: CPT

## 2020-02-15 PROCEDURE — 36415 COLL VENOUS BLD VENIPUNCTURE: CPT

## 2020-02-15 PROCEDURE — 99217 PR OBSERVATION CARE DISCHARGE: CPT | Mod: ,,, | Performed by: HOSPITALIST

## 2020-02-15 PROCEDURE — 84100 ASSAY OF PHOSPHORUS: CPT

## 2020-02-15 PROCEDURE — 94660 CPAP INITIATION&MGMT: CPT

## 2020-02-15 PROCEDURE — 63600175 PHARM REV CODE 636 W HCPCS: Performed by: NURSE PRACTITIONER

## 2020-02-15 PROCEDURE — 25000003 PHARM REV CODE 250: Performed by: NURSE PRACTITIONER

## 2020-02-15 PROCEDURE — 27000221 HC OXYGEN, UP TO 24 HOURS

## 2020-02-15 PROCEDURE — 80053 COMPREHEN METABOLIC PANEL: CPT

## 2020-02-15 PROCEDURE — 99217 PR OBSERVATION CARE DISCHARGE: ICD-10-PCS | Mod: ,,, | Performed by: HOSPITALIST

## 2020-02-15 RX ORDER — OSELTAMIVIR PHOSPHATE 75 MG/1
75 CAPSULE ORAL 2 TIMES DAILY
Qty: 7 CAPSULE | Refills: 0 | Status: SHIPPED | OUTPATIENT
Start: 2020-02-15 | End: 2020-02-19

## 2020-02-15 RX ADMIN — OSELTAMIVIR PHOSPHATE 75 MG: 75 CAPSULE ORAL at 09:02

## 2020-02-15 RX ADMIN — CLOPIDOGREL BISULFATE 75 MG: 75 TABLET ORAL at 09:02

## 2020-02-15 RX ADMIN — METOPROLOL SUCCINATE 25 MG: 25 TABLET, EXTENDED RELEASE ORAL at 09:02

## 2020-02-15 RX ADMIN — ACETAMINOPHEN 650 MG: 325 TABLET ORAL at 09:02

## 2020-02-15 RX ADMIN — APIXABAN 5 MG: 2.5 TABLET, FILM COATED ORAL at 09:02

## 2020-02-15 RX ADMIN — ALLOPURINOL 300 MG: 300 TABLET ORAL at 09:02

## 2020-02-15 RX ADMIN — METHYLPREDNISOLONE 4 MG: 4 TABLET ORAL at 09:02

## 2020-02-15 NOTE — ASSESSMENT & PLAN NOTE
Influenza A positive  CXR- Heart size normal.  Small subsegmental atelectatic changes noted at the left lung base.  Otherwise the lungs are clear.  No pleural effusion.    Tamiflu  Duonebs

## 2020-02-15 NOTE — HOSPITAL COURSE
Patient was started on IV fluids for dehydration and a course of oseltamivir.  His blood pressure was stable overnight and he remained afebrile.  By morning he was feeling better and asked to be discharged in time to make his flight home to [place] tomorrow morning.  Discharged with prescription to complete the course of oseltamivir that had been started.

## 2020-02-15 NOTE — H&P
[facility] Hospital Medicine  History & Physical    Patient Name: Teo Escalona  MRN: [id number]  Admission Date: 2/14/2020  Attending Physician: Laly Sanchez MD   Primary Care Provider: Primary Doctor No         Patient information was obtained from patient, past medical records and ER records.     Subjective:     Principal Problem:Influenza A    Chief Complaint:   Chief Complaint   Patient presents with    Influenza     sent from urgent care after being dx'd with flu.  Pt reports increased SOB.  Reports hx of MI, a.fib, and restrictive lung disease.        HPI: The is a 79 y.o. male with hx of HTN, Afib on [place], [place], and unknown restrictive lung disease who presents with complaint of fatigue since yesterday morning. He reports fever, loss of appetite, rhinorrhea, cough, chronic shortness of breath, nausea, difficulty ambulating, myalgias, and headache. He denies vomiting, diarrhea, or constipation. He was seen at Urgent Care today, had chest X-Ray, tested positive for Influenza A, and was instructed to go to the ED for further evaluation. He uses CPAP at night. He denies recent international travel. No sick contacts. He is visiting from [place] and plans to return home tomorrow.    Past Medical History:   Diagnosis Date    Autoimmune disorder     Gout     Hyperlipidemia     Hypertension        Past Surgical History:   Procedure Laterality Date    CARDIAC SURGERY         Review of patient's allergies indicates:   Allergen Reactions    Bee sting [allergen ext-venom-honey bee] Anaphylaxis    Penicillins     Ticagrelor        No current facility-administered medications on file prior to encounter.      Current Outpatient Medications on File Prior to Encounter   Medication Sig    allopurinoL (ZYLOPRIM) 300 MG tablet     apixaban (ELIQUIS) 5 mg Tab   5 mg = 1 tab(s), PO, BID    clopidogreL (PLAVIX) 75 mg tablet     losartan (COZAAR) 25 MG tablet   25 mg = 1 tab(s), PO, at  bedtime, Please get refills from Dr. Sims, # 90 tab(s), 3 Refill(s), Pharmacy: [facility] #[id number], 1 tab(s) PO at bedtime,Instr:Please get refills from Dr. Sims    methylPREDNISolone (MEDROL) 4 MG Tab Take 4 mg by mouth once daily.    metoprolol succinate (TOPROL-XL) 25 MG 24 hr tablet Take 25 mg by mouth once daily.    pravastatin (PRAVACHOL) 80 MG tablet   80 mg = 1 tab(s), PO, at bedtime, Please get refills from Dr. Sims, # 90 tab(s), 3 Refill(s), Pharmacy: [facility] #[id number], 1 tab(s) PO at bedtime,Instr:Please get refills from Dr. Sims    irbesartan (AVAPRO) 150 MG tablet      Family History     None        Tobacco Use    Smoking status: Never Smoker   Substance and Sexual Activity    Alcohol use: Yes     Comment: social    Drug use: Not on file    Sexual activity: Not on file     Review of Systems   Constitutional: Positive for activity change and appetite change. Negative for fatigue and fever.   HENT: Positive for rhinorrhea. Negative for congestion, ear pain and postnasal drip.    Eyes: Negative for discharge.   Respiratory: Positive for cough and shortness of breath. Negative for apnea and wheezing.    Cardiovascular: Negative for chest pain and leg swelling.   Gastrointestinal: Negative for abdominal distention, abdominal pain, nausea and vomiting.   Endocrine: Negative for polydipsia, polyphagia and polyuria.   Genitourinary: Negative for difficulty urinating, flank pain, frequency, hematuria and urgency.   Musculoskeletal: Positive for myalgias. Negative for arthralgias and joint swelling.   Skin: Negative for pallor and rash.   Allergic/Immunologic: Negative for environmental allergies and food allergies.   Neurological: Positive for headaches. Negative for dizziness, speech difficulty, weakness and light-headedness.   Hematological: Does not bruise/bleed easily.   Psychiatric/Behavioral: Negative for agitation.     Objective:     Vital Signs (Most Recent):  Temp: 98.9 °F  (37.2 °C) (02/14/20 1958)  Pulse: 79 (02/14/20 1958)  Resp: 17 (02/14/20 1958)  BP: (!) 149/79 (02/14/20 1958)  SpO2: (!) 93 % (02/14/20 1958) Vital Signs (24h Range):  Temp:  [98.9 °F (37.2 °C)-102.3 °F (39.1 °C)] 98.9 °F (37.2 °C)  Pulse:  [76-90] 79  Resp:  [17-22] 17  SpO2:  [91 %-97 %] 93 %  BP: (126-162)/(69-90) 149/79     Weight: 90.7 kg (200 lb)  Body mass index is 26.39 kg/m².    Physical Exam   Constitutional: He is oriented to person, place, and time. He appears well-developed and well-nourished.   HENT:   Head: Normocephalic.   Eyes: Conjunctivae are normal.   Neck: Normal range of motion. Neck supple.   Cardiovascular: Normal rate, regular rhythm, normal heart sounds and intact distal pulses.   Pulses:       Radial pulses are 2+ on the right side, and 2+ on the left side.        Dorsalis pedis pulses are 1+ on the right side, and 1+ on the left side.   Pulmonary/Chest: Effort normal. He has decreased breath sounds. He has wheezes.   Abdominal: Soft. Bowel sounds are normal. He exhibits no distension. There is no tenderness.   Musculoskeletal: Normal range of motion.   Neurological: He is alert and oriented to person, place, and time.   Skin: Skin is warm and dry.   Psychiatric: He has a normal mood and affect. His behavior is normal.           Significant Labs:   CBC:   Recent Labs   Lab 02/14/20  1650   WBC 5.60   HGB 14.9   HCT 47.8   PLT 97*     CMP:   Recent Labs   Lab 02/14/20  1650      K 3.5   CL 96   CO2 29      BUN 13   CREATININE 0.9   CALCIUM 9.5   PROT 6.9   ALBUMIN 3.9   BILITOT 1.1*   ALKPHOS 102   AST 23   ALT 24   ANIONGAP 15   EGFRNONAA >60       Significant Imaging: I have reviewed all pertinent imaging results/findings within the past 24 hours.    Assessment/Plan:     * Influenza A  Influenza A positive  CXR- Heart size normal.  Small subsegmental atelectatic changes noted at the left lung base.  Otherwise the lungs are clear.  No pleural  effusion.    Tamiflu  Duonebs      Hyperlipidemia  Lipid Panel pending    Continue pravastatin      Essential hypertension  Currently hypertensive    Continue losartan, toprol        VTE Risk Mitigation (From admission, onward)         Ordered     apixaban tablet 5 mg  2 times daily      02/14/20 1954     Place sequential compression device  Until discontinued      02/14/20 1954     IP VTE HIGH RISK PATIENT  Once      02/14/20 1954     Reason for No Pharmacological VTE Prophylaxis  Once     Question:  Reasons:  Answer:  Already adequately anticoagulated on oral Anticoagulants    02/14/20 1954                   Bert Wu NP  Department of Hospital Medicine   [facility]

## 2020-02-15 NOTE — NURSING
Pt is aware of plan of care to discharge to home. Pt AAOx4, NAD. Pt denies current pain, SOB, n/v, dizziness or lightheadedness. VS stable. Pt remains afebrile. Pt tolerated regular diet. Droplet precautions maintained. Pt ambulated in room without difficulty. Medications and discharge instructions reviewed with pt. Pt and pt's wife voiced understanding. Pt stable for discharge to home. Pt transported off of unit via wheelchair to 2nd floor [facility]. Pt's wife is accompanying him.

## 2020-02-15 NOTE — HPI
From H&P by Bert Wu NP:  The is a 79 y.o. male with hx of HTN, Afib on [place], [place], and unknown restrictive lung disease who presents with complaint of fatigue since yesterday morning. He reports fever, loss of appetite, rhinorrhea, cough, chronic shortness of breath, nausea, difficulty ambulating, myalgias, and headache. He denies vomiting, diarrhea, or constipation. He was seen at Urgent Care today, had chest X-Ray, tested positive for Influenza A, and was instructed to go to the ED for further evaluation. He uses CPAP at night. He denies recent international travel. No sick contacts. He is visiting from [place] and plans to return home tomorrow.

## 2020-02-15 NOTE — NURSING
Pt AOX4, NAD, denies SOB, NVD, or CP. Pt ambulating around the room and bedside when he first arrived. Later, settled in bed, received his nightly medication, resp provided a CPAP machine and pt slept throughout the shift, waking easily for care. Influenza precautions maintained. Tele placed. Bed low and locked. Call bell within reach. Will continue to monitor.     NOTE: Pt lives in [place]. Has a flight [place] morning. Requests to be discharged sometime Saturday.

## 2020-02-15 NOTE — DISCHARGE SUMMARY
[facility] Hospital Medicine  Discharge Summary      Patient Name: Teo Escalona  MRN: [id number]  Admission Date: 2/14/2020  Hospital Length of Stay: 0 days  Discharge Date and Time:  02/15/2020 10:41 AM  Attending Physician: Laly Sanchez MD   Discharging Provider: Laly Sanchez MD  Primary Care Provider: Primary Doctor No      HPI:   From H&P by Bert Wu NP:  The is a 79 y.o. male with hx of HTN, Afib on [facility], [place], and unknown restrictive lung disease who presents with complaint of fatigue since yesterday morning. He reports fever, loss of appetite, rhinorrhea, cough, chronic shortness of breath, nausea, difficulty ambulating, myalgias, and headache. He denies vomiting, diarrhea, or constipation. He was seen at Urgent Care today, had chest X-Ray, tested positive for Influenza A, and was instructed to go to the ED for further evaluation. He uses CPAP at night. He denies recent international travel. No sick contacts. He is visiting from [place] and plans to return home tomorrow.          Hospital Course:   Patient was started on IV fluids for dehydration and a course of oseltamivir.  His blood pressure was stable overnight and he remained afebrile.  By morning he was feeling better and asked to be discharged in time to make his flight home to [place] tomorrow morning.  Discharged with prescription to complete the course of oseltamivir that had been started.           Final Active Diagnoses:    Diagnosis Date Noted POA    PRINCIPAL PROBLEM:  Influenza A [J10.1] 02/14/2020 Yes    Dehydration [E86.0]  Yes    Restrictive lung disease [J98.4]  Yes    Essential hypertension [I10] 02/14/2020 Yes    Hyperlipidemia [E78.5] 02/14/2020 Yes      Problems Resolved During this Admission:       Discharged Condition: stable    Disposition: Home or Self Care    Follow Up: With PCP in 1-2 weeks    Patient Instructions:      Diet Cardiac     Activity as tolerated        Medications:  Reconciled Home Medications:      Medication List      START taking these medications    oseltamivir 75 MG capsule  Commonly known as:  TAMIFLU  Take 1 capsule (75 mg total) by mouth 2 (two) times daily. for 7 doses        CONTINUE taking these medications    allopurinoL 300 MG tablet  Commonly known as:  ZYLOPRIM     apixaban 5 mg Tab  Commonly known as:  ELIQUIS  5 mg = 1 tab(s), PO, BID     clopidogreL 75 mg tablet  Commonly known as:  PLAVIX     irbesartan 150 MG tablet  Commonly known as:  AVAPRO     losartan 25 MG tablet  Commonly known as:  COZAAR  25 mg = 1 tab(s), PO, at bedtime, Please get refills from Dr. Sims, # 90 tab(s), 3 Refill(s), Pharmacy: [facility] #[id number], 1 tab(s) PO at bedtime,Instr:Please get refills from Dr. Sims     methylPREDNISolone 4 MG Tab  Commonly known as:  MEDROL  Take 4 mg by mouth once daily.     metoprolol succinate 25 MG 24 hr tablet  Commonly known as:  TOPROL-XL  Take 25 mg by mouth once daily.     pravastatin 80 MG tablet  Commonly known as:  PRAVACHOL  80 mg = 1 tab(s), PO, at bedtime, Please get refills from Dr. Sims, # 90 tab(s), 3 Refill(s), Pharmacy: [facility] #[id number], 1 tab(s) PO at bedtime,Instr:Please get refills from Dr. Sims            Time spent on the discharge of patient: <30 minutes  Patient was seen and examined on the date of discharge and determined to be suitable for discharge.         Laly Webb MD  Department of Hospital Medicine  [facility]

## 2020-02-15 NOTE — SUBJECTIVE & OBJECTIVE
Past Medical History:   Diagnosis Date    Autoimmune disorder     Gout     Hyperlipidemia     Hypertension        Past Surgical History:   Procedure Laterality Date    CARDIAC SURGERY         Review of patient's allergies indicates:   Allergen Reactions    Bee sting [allergen ext-venom-honey bee] Anaphylaxis    Penicillins     Ticagrelor        No current facility-administered medications on file prior to encounter.      Current Outpatient Medications on File Prior to Encounter   Medication Sig    allopurinoL (ZYLOPRIM) 300 MG tablet     apixaban (ELIQUIS) 5 mg Tab   5 mg = 1 tab(s), PO, BID    clopidogreL (PLAVIX) 75 mg tablet     losartan (COZAAR) 25 MG tablet   25 mg = 1 tab(s), PO, at bedtime, Please get refills from Dr. Sims, # 90 tab(s), 3 Refill(s), Pharmacy: [facility] #[id number], 1 tab(s) PO at bedtime,Instr:Please get refills from Dr. Sims    methylPREDNISolone (MEDROL) 4 MG Tab Take 4 mg by mouth once daily.    metoprolol succinate (TOPROL-XL) 25 MG 24 hr tablet Take 25 mg by mouth once daily.    pravastatin (PRAVACHOL) 80 MG tablet   80 mg = 1 tab(s), PO, at bedtime, Please get refills from Dr. Sims, # 90 tab(s), 3 Refill(s), Pharmacy: [facility] #[id number], 1 tab(s) PO at bedtime,Instr:Please get refills from Dr. Sims    irbesartan (AVAPRO) 150 MG tablet      Family History     None        Tobacco Use    Smoking status: Never Smoker   Substance and Sexual Activity    Alcohol use: Yes     Comment: social    Drug use: Not on file    Sexual activity: Not on file     Review of Systems   Constitutional: Positive for activity change and appetite change. Negative for fatigue and fever.   HENT: Positive for rhinorrhea. Negative for congestion, ear pain and postnasal drip.    Eyes: Negative for discharge.   Respiratory: Positive for cough and shortness of breath. Negative for apnea and wheezing.    Cardiovascular: Negative for chest pain and leg swelling.   Gastrointestinal:  Negative for abdominal distention, abdominal pain, nausea and vomiting.   Endocrine: Negative for polydipsia, polyphagia and polyuria.   Genitourinary: Negative for difficulty urinating, flank pain, frequency, hematuria and urgency.   Musculoskeletal: Positive for myalgias. Negative for arthralgias and joint swelling.   Skin: Negative for pallor and rash.   Allergic/Immunologic: Negative for environmental allergies and food allergies.   Neurological: Positive for headaches. Negative for dizziness, speech difficulty, weakness and light-headedness.   Hematological: Does not bruise/bleed easily.   Psychiatric/Behavioral: Negative for agitation.     Objective:     Vital Signs (Most Recent):  Temp: 98.9 °F (37.2 °C) (02/14/20 1958)  Pulse: 79 (02/14/20 1958)  Resp: 17 (02/14/20 1958)  BP: (!) 149/79 (02/14/20 1958)  SpO2: (!) 93 % (02/14/20 1958) Vital Signs (24h Range):  Temp:  [98.9 °F (37.2 °C)-102.3 °F (39.1 °C)] 98.9 °F (37.2 °C)  Pulse:  [76-90] 79  Resp:  [17-22] 17  SpO2:  [91 %-97 %] 93 %  BP: (126-162)/(69-90) 149/79     Weight: 90.7 kg (200 lb)  Body mass index is 26.39 kg/m².    Physical Exam   Constitutional: He is oriented to person, place, and time. He appears well-developed and well-nourished.   HENT:   Head: Normocephalic.   Eyes: Conjunctivae are normal.   Neck: Normal range of motion. Neck supple.   Cardiovascular: Normal rate, regular rhythm, normal heart sounds and intact distal pulses.   Pulses:       Radial pulses are 2+ on the right side, and 2+ on the left side.        Dorsalis pedis pulses are 1+ on the right side, and 1+ on the left side.   Pulmonary/Chest: Effort normal. He has decreased breath sounds. He has wheezes.   Abdominal: Soft. Bowel sounds are normal. He exhibits no distension. There is no tenderness.   Musculoskeletal: Normal range of motion.   Neurological: He is alert and oriented to person, place, and time.   Skin: Skin is warm and dry.   Psychiatric: He has a normal mood and  affect. His behavior is normal.           Significant Labs:   CBC:   Recent Labs   Lab 02/14/20  1650   WBC 5.60   HGB 14.9   HCT 47.8   PLT 97*     CMP:   Recent Labs   Lab 02/14/20  1650      K 3.5   CL 96   CO2 29      BUN 13   CREATININE 0.9   CALCIUM 9.5   PROT 6.9   ALBUMIN 3.9   BILITOT 1.1*   ALKPHOS 102   AST 23   ALT 24   ANIONGAP 15   EGFRNONAA >60       Significant Imaging: I have reviewed all pertinent imaging results/findings within the past 24 hours.   swing-to gait